# Patient Record
Sex: MALE | URBAN - NONMETROPOLITAN AREA
[De-identification: names, ages, dates, MRNs, and addresses within clinical notes are randomized per-mention and may not be internally consistent; named-entity substitution may affect disease eponyms.]

---

## 2019-06-06 ENCOUNTER — OFFICE VISIT (OUTPATIENT)
Dept: URBAN - NONMETROPOLITAN AREA CLINIC 9 | Facility: CLINIC | Age: 64
End: 2019-06-06

## 2019-06-06 DIAGNOSIS — H01.8 OTHER SPECIFIED INFLAMMATIONS OF EYELID: Chronic | ICD-10-CM

## 2019-06-06 PROCEDURE — 92002 INTRM OPH EXAM NEW PATIENT: CPT | Performed by: OPTOMETRIST

## 2019-06-06 RX ORDER — BACITRACIN 500 [USP'U]/G
OINTMENT OPHTHALMIC
Qty: 1 | Refills: 6 | Status: INACTIVE
Start: 2019-06-06 | End: 2020-07-15

## 2019-06-06 ASSESSMENT — INTRAOCULAR PRESSURE
OD: 20
OS: 20

## 2019-06-06 NOTE — IMPRESSION/PLAN
Impression: Age-related nuclear cataract, right eye: H25.11. Plan: Discussed diagnosis with patient in detail. No treatment is required at this time. Will continue to observe condition and/or symptoms. Patient instructed to call if condition gets worse.

## 2019-06-06 NOTE — IMPRESSION/PLAN
Impression: Dry eye syndrome of bilateral lacrimal glands: H04.123. Plan: Discussed diagnosis in detail with patient. Dry eye accounts for the patient's symptoms. Dry eye is a chronic condition and does not have a cure and will need artificial tears for maintenance. There is no evidence of permanent changes to the cornea. Recommend Systane Balance or Complete OU QID longterm. Monitor for changes.

## 2019-06-06 NOTE — IMPRESSION/PLAN
Impression: Other specified inflammations of eyelid: H01.8. OU. Plan: Blepharitis accounts for the patient's symptoms. Lid scrubs with diluted Shay Farrow and Conner tear free baby shampoo as directed and Bacitracin ointment qhs OU and monitor.

## 2020-07-15 ENCOUNTER — OFFICE VISIT (OUTPATIENT)
Dept: URBAN - NONMETROPOLITAN AREA CLINIC 9 | Facility: CLINIC | Age: 65
End: 2020-07-15
Payer: COMMERCIAL

## 2020-07-15 DIAGNOSIS — H59.812: Chronic | ICD-10-CM

## 2020-07-15 PROCEDURE — 92014 COMPRE OPH EXAM EST PT 1/>: CPT | Performed by: OPTOMETRIST

## 2020-07-15 RX ORDER — BACITRACIN 500 [USP'U]/G
OINTMENT OPHTHALMIC
Qty: 1 | Refills: 4 | Status: INACTIVE
Start: 2020-07-15 | End: 2021-05-20

## 2020-07-15 ASSESSMENT — INTRAOCULAR PRESSURE
OS: 20
OD: 18

## 2020-07-15 ASSESSMENT — VISUAL ACUITY
OD: 20/30
OS: 20/40

## 2020-07-15 ASSESSMENT — KERATOMETRY
OS: 42.13
OD: 41.38

## 2020-07-15 NOTE — IMPRESSION/PLAN
Impression: Age-related nuclear cataract, right eye: H25.11. Plan: Cataracts account for the patient's complaints. Discussed all risks, benefits, procedures and recovery of cataracts surgery. Patient understands that changing the glasses prescription will not significantly improve vision. Patient desires to have surgery, refer to Dr. Demetria Hampton for evaluation phacoemulsification with IOL.

## 2020-07-15 NOTE — IMPRESSION/PLAN
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 Plan: Stable vision OS, s/p RD repair. Discussed diagnosis with patient in detail. No treatment is required at this time. Will continue to observe condition and/or symptoms. Patient instructed to call if condition gets worse.

## 2020-07-15 NOTE — IMPRESSION/PLAN
Impression: Other specified inflammations of eyelid: H01.8. OU. Plan: Blepharitis accounts for the patient's symptoms. Lid scrubs with diluted Genene Chuy and Conner tear free baby shampoo as directed and Bacitracin ointment qhs OU and monitor.

## 2020-08-13 ENCOUNTER — OFFICE VISIT (OUTPATIENT)
Dept: URBAN - METROPOLITAN AREA CLINIC 62 | Facility: CLINIC | Age: 65
End: 2020-08-13
Payer: COMMERCIAL

## 2020-08-13 PROCEDURE — 92004 COMPRE OPH EXAM NEW PT 1/>: CPT | Performed by: OPHTHALMOLOGY

## 2020-08-13 PROCEDURE — 92133 CPTRZD OPH DX IMG PST SGM ON: CPT | Performed by: OPHTHALMOLOGY

## 2020-08-13 PROCEDURE — 76514 ECHO EXAM OF EYE THICKNESS: CPT | Performed by: OPHTHALMOLOGY

## 2020-08-13 RX ORDER — LATANOPROST 50 UG/ML
0.005 % SOLUTION OPHTHALMIC
Qty: 1 | Refills: 6 | Status: INACTIVE
Start: 2020-08-13 | End: 2021-03-26

## 2020-08-13 ASSESSMENT — INTRAOCULAR PRESSURE
OD: 16
OS: 19

## 2020-08-13 NOTE — IMPRESSION/PLAN
I spoke with Ira valle.  She just tapered off and stopped taking the sertraline altogether.  She is doing well without it.   Impression: Primary open-angle glaucoma, bilateral, indeterminate stage: H40.1134. Plan: Rec. trial latanoprost qHS OU.  3 to 4 weeks IOP check, and VF in [facility].

## 2020-08-13 NOTE — IMPRESSION/PLAN
Impression: Age-related nuclear cataract, right eye: H25.11. Plan: Consider surgery when symptoms worsen.

## 2020-08-27 ENCOUNTER — TESTING ONLY (OUTPATIENT)
Dept: URBAN - NONMETROPOLITAN AREA CLINIC 9 | Facility: CLINIC | Age: 65
End: 2020-08-27
Payer: COMMERCIAL

## 2020-08-27 PROCEDURE — 92083 EXTENDED VISUAL FIELD XM: CPT | Performed by: OPHTHALMOLOGY

## 2020-09-10 ENCOUNTER — OFFICE VISIT (OUTPATIENT)
Dept: URBAN - NONMETROPOLITAN AREA CLINIC 9 | Facility: CLINIC | Age: 65
End: 2020-09-10
Payer: COMMERCIAL

## 2020-09-10 DIAGNOSIS — Z96.1 PRESENCE OF PSEUDOPHAKIA: ICD-10-CM

## 2020-09-10 DIAGNOSIS — H25.11 AGE-RELATED NUCLEAR CATARACT, RIGHT EYE: ICD-10-CM

## 2020-09-10 PROCEDURE — 92083 EXTENDED VISUAL FIELD XM: CPT | Performed by: OPHTHALMOLOGY

## 2020-09-10 PROCEDURE — 92012 INTRM OPH EXAM EST PATIENT: CPT | Performed by: OPHTHALMOLOGY

## 2020-09-10 ASSESSMENT — INTRAOCULAR PRESSURE
OD: 14
OS: 16

## 2020-09-10 NOTE — IMPRESSION/PLAN
Impression: Primary open-angle glaucoma, bilateral, indeterminate stage: H40.1134. Plan: IOP improved on Latanoprost. Cont latanoprost qHS OU.

## 2021-03-26 ENCOUNTER — OFFICE VISIT (OUTPATIENT)
Dept: URBAN - NONMETROPOLITAN AREA CLINIC 9 | Facility: CLINIC | Age: 66
End: 2021-03-26
Payer: COMMERCIAL

## 2021-03-26 DIAGNOSIS — H25.811 COMBINED FORMS OF AGE-RELATED CATARACT, RIGHT EYE: Chronic | ICD-10-CM

## 2021-03-26 DIAGNOSIS — H40.1134 PRIMARY OPEN-ANGLE GLAUCOMA, BILATERAL, INDETERMINATE STAGE: Primary | Chronic | ICD-10-CM

## 2021-03-26 DIAGNOSIS — H04.123 DRY EYE SYNDROME OF BILATERAL LACRIMAL GLANDS: Chronic | ICD-10-CM

## 2021-03-26 PROCEDURE — 99214 OFFICE O/P EST MOD 30 MIN: CPT | Performed by: OPTOMETRIST

## 2021-03-26 RX ORDER — LATANOPROST 50 UG/ML
0.005 % SOLUTION OPHTHALMIC
Qty: 2.5 | Refills: 6 | Status: INACTIVE
Start: 2021-03-26 | End: 2021-06-17

## 2021-03-26 ASSESSMENT — INTRAOCULAR PRESSURE
OD: 18
OS: 20

## 2021-03-26 NOTE — IMPRESSION/PLAN
Impression: Primary open-angle glaucoma, bilateral, indeterminate stage: H40.1134. Plan: IOP elevated today. Cont latanoprost qHS OU. The patient's eye(s) are at risk of angle closure due to narrow anatomical angles. Discussed risks of angle closure to vision and recommended LPI eval/Tx with surgeon. Discussed symptoms of angle closure and instructed patient to notify clinic immediately if they occur prior to treatment. RTC with surgeon for LPI eval/Tx as indicated.

## 2021-05-20 ENCOUNTER — OFFICE VISIT (OUTPATIENT)
Dept: URBAN - NONMETROPOLITAN AREA CLINIC 9 | Facility: CLINIC | Age: 66
End: 2021-05-20
Payer: COMMERCIAL

## 2021-05-20 PROCEDURE — 99212 OFFICE O/P EST SF 10 MIN: CPT | Performed by: OPHTHALMOLOGY

## 2021-05-20 PROCEDURE — 92020 GONIOSCOPY: CPT | Performed by: OPHTHALMOLOGY

## 2021-05-20 RX ORDER — BIMATOPROST 0.3 MG/ML
0.03 % SOLUTION/ DROPS OPHTHALMIC
Qty: 3 | Refills: 6 | Status: INACTIVE
Start: 2021-05-20 | End: 2021-09-17

## 2021-05-20 RX ORDER — BIMATOPROST 0.1 MG/ML
0.01 % SOLUTION/ DROPS OPHTHALMIC
Qty: 3 | Refills: 6 | Status: INACTIVE
Start: 2021-05-20 | End: 2022-02-25

## 2021-05-20 ASSESSMENT — INTRAOCULAR PRESSURE
OD: 20
OD: 21
OS: 19
OS: 20

## 2021-05-20 NOTE — IMPRESSION/PLAN
Impression: Primary open-angle glaucoma, bilateral, severe stage: Z65.0897. Plan: Start Lumigan OU q HS if no better consider surgery.

## 2021-06-17 ENCOUNTER — OFFICE VISIT (OUTPATIENT)
Dept: URBAN - NONMETROPOLITAN AREA CLINIC 9 | Facility: CLINIC | Age: 66
End: 2021-06-17
Payer: COMMERCIAL

## 2021-06-17 DIAGNOSIS — H40.1133 PRIMARY OPEN-ANGLE GLAUCOMA, BILATERAL, SEVERE STAGE: Primary | ICD-10-CM

## 2021-06-17 PROCEDURE — 99212 OFFICE O/P EST SF 10 MIN: CPT | Performed by: OPHTHALMOLOGY

## 2021-06-17 ASSESSMENT — INTRAOCULAR PRESSURE
OS: 15
OS: 16
OD: 14
OD: 12

## 2021-06-17 NOTE — IMPRESSION/PLAN
Impression: Primary open-angle glaucoma, bilateral, severe stage: E63.0684. Plan: IOP improved on Lumigan OU QHS. Cont. Lumigan OU QHS. if no better consider surgery.

## 2021-09-17 ENCOUNTER — OFFICE VISIT (OUTPATIENT)
Dept: URBAN - NONMETROPOLITAN AREA CLINIC 8 | Facility: CLINIC | Age: 66
End: 2021-09-17
Payer: COMMERCIAL

## 2021-09-17 PROCEDURE — 99214 OFFICE O/P EST MOD 30 MIN: CPT | Performed by: OPTOMETRIST

## 2021-09-17 RX ORDER — DORZOLAMIDE HYDROCHLORIDE AND TIMOLOL MALEATE 20; 5 MG/ML; MG/ML
SOLUTION/ DROPS OPHTHALMIC
Qty: 5 | Refills: 6 | Status: INACTIVE
Start: 2021-09-17 | End: 2022-02-25

## 2021-09-17 ASSESSMENT — INTRAOCULAR PRESSURE
OS: 16
OD: 15

## 2021-09-17 NOTE — IMPRESSION/PLAN
Impression: Primary open-angle glaucoma, bilateral, severe stage: D92.1567. Plan: IOP Stable OU. Stop Lumigan OU QHS due to [place]. Start Dorz/Timolol BID OU. Rx given today.

## 2021-10-29 ENCOUNTER — OFFICE VISIT (OUTPATIENT)
Dept: URBAN - NONMETROPOLITAN AREA CLINIC 8 | Facility: CLINIC | Age: 66
End: 2021-10-29
Payer: COMMERCIAL

## 2021-10-29 PROCEDURE — 99213 OFFICE O/P EST LOW 20 MIN: CPT | Performed by: OPTOMETRIST

## 2021-10-29 ASSESSMENT — INTRAOCULAR PRESSURE
OD: 11
OS: 12

## 2021-10-29 NOTE — IMPRESSION/PLAN
Impression: Primary open-angle glaucoma, bilateral, severe stage: O59.1442. Plan: IOP Stable OU. Continue Dorz/Timolol BID OU.

## 2022-02-25 ENCOUNTER — OFFICE VISIT (OUTPATIENT)
Dept: URBAN - NONMETROPOLITAN AREA CLINIC 8 | Facility: CLINIC | Age: 67
End: 2022-02-25
Payer: COMMERCIAL

## 2022-02-25 PROCEDURE — 99214 OFFICE O/P EST MOD 30 MIN: CPT | Performed by: OPTOMETRIST

## 2022-02-25 RX ORDER — DORZOLAMIDE HYDROCHLORIDE AND TIMOLOL MALEATE 20; 5 MG/ML; MG/ML
SOLUTION/ DROPS OPHTHALMIC
Qty: 5 | Refills: 6 | Status: ACTIVE
Start: 2022-02-25

## 2022-02-25 ASSESSMENT — KERATOMETRY
OS: 41.75
OD: 41.25

## 2022-02-25 ASSESSMENT — INTRAOCULAR PRESSURE
OD: 14
OS: 14

## 2022-02-25 NOTE — IMPRESSION/PLAN
Impression: Primary open-angle glaucoma, bilateral, severe stage: B14.0952. Plan: IOP Stable OU. Will continue same regimen, tolerating medication Dorz/Timolol BID OU. RTC for VF 24-2. eRx sent today.

## 2022-02-25 NOTE — IMPRESSION/PLAN
Impression: Dry eye syndrome of bilateral lacrimal glands: H04.123. Plan: Continue artificial tears in OU for comfort, 3-4x's a day.

## 2022-06-22 ENCOUNTER — TESTING ONLY (OUTPATIENT)
Dept: URBAN - NONMETROPOLITAN AREA CLINIC 8 | Facility: CLINIC | Age: 67
End: 2022-06-22
Payer: COMMERCIAL

## 2022-06-22 DIAGNOSIS — H40.1133 PRIMARY OPEN-ANGLE GLAUCOMA, BILATERAL, SEVERE STAGE: Primary | ICD-10-CM

## 2022-07-08 ENCOUNTER — OFFICE VISIT (OUTPATIENT)
Dept: URBAN - NONMETROPOLITAN AREA CLINIC 8 | Facility: CLINIC | Age: 67
End: 2022-07-08
Payer: COMMERCIAL

## 2022-07-08 DIAGNOSIS — H04.123 DRY EYE SYNDROME OF BILATERAL LACRIMAL GLANDS: ICD-10-CM

## 2022-07-08 DIAGNOSIS — H35.443 AGE-RELATED RETICULAR DEGENERATION OF RETINA, BILATERAL: ICD-10-CM

## 2022-07-08 DIAGNOSIS — H40.1133 PRIMARY OPEN-ANGLE GLAUCOMA, BILATERAL, SEVERE STAGE: Primary | ICD-10-CM

## 2022-07-08 DIAGNOSIS — Z96.1 PRESENCE OF INTRAOCULAR LENS: ICD-10-CM

## 2022-07-08 PROCEDURE — 99213 OFFICE O/P EST LOW 20 MIN: CPT | Performed by: OPTOMETRIST

## 2022-07-08 RX ORDER — DORZOLAMIDE HYDROCHLORIDE AND TIMOLOL MALEATE 20; 5 MG/ML; MG/ML
SOLUTION/ DROPS OPHTHALMIC
Qty: 5 | Refills: 6 | Status: ACTIVE
Start: 2022-07-08

## 2022-07-08 ASSESSMENT — INTRAOCULAR PRESSURE
OS: 13
OD: 13

## 2022-07-08 NOTE — IMPRESSION/PLAN
Impression: Age-related reticular degeneration of retina, bilateral: H35.443. Plan: Discussed diagnosis in detail with patient. No treatment is required at this time. Will continue to observe condition and or symptoms. Patient instructed to call if condition gets worse or VA worsens.

## 2022-07-08 NOTE — IMPRESSION/PLAN
Impression: Presence of intraocular lens: Z96.1. Left. Plan: Discussed diagnosis in detail with patient. No treatment is required at this time. Will continue to observe condition and or symptoms. Patient instructed to call if condition gets worse or VA worsens.

## 2022-07-08 NOTE — IMPRESSION/PLAN
Impression: Primary open-angle glaucoma, bilateral, severe stage: K06.2376. Plan: Intraocular pressure well controlled, tolerating medications. Will continue with same regimen.  cosopt BID OU

## 2022-11-11 ENCOUNTER — OFFICE VISIT (OUTPATIENT)
Dept: URBAN - NONMETROPOLITAN AREA CLINIC 8 | Facility: CLINIC | Age: 67
End: 2022-11-11
Payer: COMMERCIAL

## 2022-11-11 DIAGNOSIS — H10.45 OTHER CHRONIC ALLERGIC CONJUNCTIVITIS: ICD-10-CM

## 2022-11-11 DIAGNOSIS — H40.1133 PRIMARY OPEN-ANGLE GLAUCOMA, BILATERAL, SEVERE STAGE: Primary | ICD-10-CM

## 2022-11-11 DIAGNOSIS — H04.123 DRY EYE SYNDROME OF BILATERAL LACRIMAL GLANDS: ICD-10-CM

## 2022-11-11 PROCEDURE — 99214 OFFICE O/P EST MOD 30 MIN: CPT | Performed by: OPTOMETRIST

## 2022-11-11 RX ORDER — PREDNISOLONE ACETATE 10 MG/ML
1 % SUSPENSION/ DROPS OPHTHALMIC
Qty: 5 | Refills: 0 | Status: INACTIVE
Start: 2022-11-11 | End: 2022-11-20

## 2022-11-11 RX ORDER — OLOPATADINE HYDROCHLORIDE OPHTHALMIC 2 MG/ML
0.2 % SOLUTION OPHTHALMIC
Qty: 2.5 | Refills: 11 | Status: ACTIVE
Start: 2022-11-11

## 2022-11-11 ASSESSMENT — INTRAOCULAR PRESSURE
OS: 14
OD: 14

## 2022-11-11 NOTE — IMPRESSION/PLAN
Impression: Other chronic allergic conjunctivitis: H10.45. Plan: Patient educated that symptoms are caused by ocular allergies and that treatment will help alleviate symptoms but that it will not prevent allergies. Patient educated that allergy testing with an allergy specialist may be necessary to identify  allergens affecting patient and treat condition. Prescribed Prednisolone acetate 1% QID OU 10 days, then start Olopatadine QD OU for maintenance. May use Opcon A OTC for ocular itch.

## 2022-11-11 NOTE — IMPRESSION/PLAN
Impression: Primary open-angle glaucoma, bilateral, severe stage: P68.2056. Plan: IOP stable. Continue to use Dorzolamide/ Timolol ou BID.

## 2023-05-12 ENCOUNTER — OFFICE VISIT (OUTPATIENT)
Dept: URBAN - NONMETROPOLITAN AREA CLINIC 8 | Facility: CLINIC | Age: 68
End: 2023-05-12
Payer: MEDICARE

## 2023-05-12 DIAGNOSIS — H25.11 AGE-RELATED NUCLEAR CATARACT OF RIGHT EYE: ICD-10-CM

## 2023-05-12 DIAGNOSIS — Z96.1 PRESENCE OF PSEUDOPHAKIA: ICD-10-CM

## 2023-05-12 DIAGNOSIS — H40.1133 PRIMARY OPEN-ANGLE GLAUCOMA, SEVERE STAGE, BILATERAL: Primary | ICD-10-CM

## 2023-05-12 DIAGNOSIS — H04.123 DRY EYE SYNDROME OF BILATERAL LACRIMAL GLANDS: ICD-10-CM

## 2023-05-12 PROCEDURE — 92014 COMPRE OPH EXAM EST PT 1/>: CPT | Performed by: OPTOMETRIST

## 2023-05-12 RX ORDER — DORZOLAMIDE HYDROCHLORIDE AND TIMOLOL MALEATE 20; 5 MG/ML; MG/ML
SOLUTION/ DROPS OPHTHALMIC
Qty: 5 | Refills: 5 | Status: ACTIVE
Start: 2023-05-12

## 2023-05-12 ASSESSMENT — INTRAOCULAR PRESSURE
OS: 14
OD: 15

## 2023-05-12 ASSESSMENT — KERATOMETRY
OD: 41.00
OS: 41.88

## 2023-05-12 NOTE — IMPRESSION/PLAN
Impression: Primary open-angle glaucoma, bilateral, severe stage: B40.3107. Plan: Intraocular pressure well controlled, tolerating medications. Will continue with same regimen. Dorzolamide/ Timolol ou BID. RTC for VF 24-2 and RNFL OCT. eRx sent today.

## 2023-05-12 NOTE — IMPRESSION/PLAN
Impression: Dry eye syndrome of bilateral lacrimal glands: H04.123. Plan: Continue OTC artificial tears in OU for comfort, 3-4x's a day.

## 2023-08-30 ENCOUNTER — TESTING ONLY (OUTPATIENT)
Dept: URBAN - NONMETROPOLITAN AREA CLINIC 8 | Facility: CLINIC | Age: 68
End: 2023-08-30
Payer: MEDICARE

## 2023-08-30 DIAGNOSIS — H40.1133 PRIMARY OPEN-ANGLE GLAUCOMA, BILATERAL, SEVERE STAGE: Primary | ICD-10-CM

## 2023-08-30 PROCEDURE — 92083 EXTENDED VISUAL FIELD XM: CPT | Performed by: OPTOMETRIST

## 2023-08-30 PROCEDURE — 92133 CPTRZD OPH DX IMG PST SGM ON: CPT | Performed by: OPTOMETRIST

## 2023-09-15 ENCOUNTER — OFFICE VISIT (OUTPATIENT)
Dept: URBAN - NONMETROPOLITAN AREA CLINIC 8 | Facility: CLINIC | Age: 68
End: 2023-09-15
Payer: MEDICARE

## 2023-09-15 DIAGNOSIS — H04.123 DRY EYE SYNDROME OF BILATERAL LACRIMAL GLANDS: ICD-10-CM

## 2023-09-15 DIAGNOSIS — H40.1133 PRIMARY OPEN-ANGLE GLAUCOMA, BILATERAL, SEVERE STAGE: Primary | ICD-10-CM

## 2023-09-15 PROCEDURE — 99213 OFFICE O/P EST LOW 20 MIN: CPT | Performed by: OPTOMETRIST

## 2023-09-15 RX ORDER — DORZOLAMIDE HYDROCHLORIDE AND TIMOLOL MALEATE 20; 5 MG/ML; MG/ML
SOLUTION/ DROPS OPHTHALMIC
Qty: 10 | Refills: 4 | Status: ACTIVE
Start: 2023-09-15

## 2023-09-15 ASSESSMENT — INTRAOCULAR PRESSURE
OS: 16
OD: 15

## 2024-01-11 ENCOUNTER — OFFICE VISIT (OUTPATIENT)
Dept: URBAN - NONMETROPOLITAN AREA CLINIC 8 | Facility: CLINIC | Age: 69
End: 2024-01-11
Payer: MEDICARE

## 2024-01-11 DIAGNOSIS — H40.1133 PRIMARY OPEN-ANGLE GLAUCOMA, BILATERAL, SEVERE STAGE: Primary | ICD-10-CM

## 2024-01-11 DIAGNOSIS — H04.123 DRY EYE SYNDROME OF BILATERAL LACRIMAL GLANDS: ICD-10-CM

## 2024-01-11 PROCEDURE — 99213 OFFICE O/P EST LOW 20 MIN: CPT | Performed by: OPTOMETRIST

## 2024-01-11 RX ORDER — DORZOLAMIDE HYDROCHLORIDE AND TIMOLOL MALEATE 20; 5 MG/ML; MG/ML
SOLUTION/ DROPS OPHTHALMIC
Qty: 10 | Refills: 5 | Status: ACTIVE
Start: 2024-01-11

## 2024-01-11 ASSESSMENT — INTRAOCULAR PRESSURE
OS: 14
OD: 14

## 2024-05-10 ENCOUNTER — OFFICE VISIT (OUTPATIENT)
Dept: URBAN - NONMETROPOLITAN AREA CLINIC 8 | Facility: CLINIC | Age: 69
End: 2024-05-10
Payer: MEDICARE

## 2024-05-10 DIAGNOSIS — H04.123 DRY EYE SYNDROME OF BILATERAL LACRIMAL GLANDS: ICD-10-CM

## 2024-05-10 DIAGNOSIS — H40.1133 PRIMARY OPEN-ANGLE GLAUCOMA, BILATERAL, SEVERE STAGE: Primary | ICD-10-CM

## 2024-05-10 PROCEDURE — 99213 OFFICE O/P EST LOW 20 MIN: CPT | Performed by: OPTOMETRIST

## 2024-05-10 RX ORDER — DORZOLAMIDE HYDROCHLORIDE AND TIMOLOL MALEATE 20; 5 MG/ML; MG/ML
SOLUTION/ DROPS OPHTHALMIC
Qty: 5 | Refills: 4 | Status: ACTIVE
Start: 2024-05-10

## 2024-05-10 ASSESSMENT — INTRAOCULAR PRESSURE
OD: 15
OS: 15

## 2024-09-09 ENCOUNTER — TECH ONLY (OUTPATIENT)
Dept: URBAN - NONMETROPOLITAN AREA CLINIC 8 | Facility: CLINIC | Age: 69
End: 2024-09-09
Payer: MEDICARE

## 2024-09-09 DIAGNOSIS — H40.1133 PRIMARY OPEN-ANGLE GLAUCOMA, BILATERAL, SEVERE STAGE: Primary | ICD-10-CM

## 2024-09-09 PROCEDURE — 92134 CPTRZ OPH DX IMG PST SGM RTA: CPT | Performed by: OPTOMETRIST

## 2024-09-13 ENCOUNTER — OFFICE VISIT (OUTPATIENT)
Dept: URBAN - NONMETROPOLITAN AREA CLINIC 8 | Facility: CLINIC | Age: 69
End: 2024-09-13
Payer: MEDICARE

## 2024-09-13 DIAGNOSIS — H04.123 DRY EYE SYNDROME OF BILATERAL LACRIMAL GLANDS: ICD-10-CM

## 2024-09-13 DIAGNOSIS — H40.1111 PRIMARY OPEN-ANGLE GLAUCOMA, MILD STAGE, RIGHT EYE: Primary | ICD-10-CM

## 2024-09-13 DIAGNOSIS — H40.1123 PRIMARY OPEN-ANGLE GLAUCOMA, SEVERE STAGE, LEFT EYE: ICD-10-CM

## 2024-09-13 PROCEDURE — 92014 COMPRE OPH EXAM EST PT 1/>: CPT | Performed by: OPTOMETRIST

## 2024-09-13 RX ORDER — DORZOLAMIDE HYDROCHLORIDE AND TIMOLOL MALEATE 20; 5 MG/ML; MG/ML
SOLUTION/ DROPS OPHTHALMIC
Qty: 5 | Refills: 4 | Status: ACTIVE
Start: 2024-09-13

## 2024-09-13 ASSESSMENT — INTRAOCULAR PRESSURE
OS: 15
OD: 14

## 2024-09-13 ASSESSMENT — KERATOMETRY
OS: 42.00
OD: 41.25

## 2025-01-16 ENCOUNTER — OFFICE VISIT (OUTPATIENT)
Dept: URBAN - NONMETROPOLITAN AREA CLINIC 8 | Facility: CLINIC | Age: 70
End: 2025-01-16
Payer: MEDICARE

## 2025-01-16 DIAGNOSIS — H40.1123 PRIMARY OPEN-ANGLE GLAUCOMA, SEVERE STAGE, LEFT EYE: Primary | ICD-10-CM

## 2025-01-16 DIAGNOSIS — H04.123 DRY EYE SYNDROME OF BILATERAL LACRIMAL GLANDS: ICD-10-CM

## 2025-01-16 DIAGNOSIS — H40.1111 PRIMARY OPEN-ANGLE GLAUCOMA, MILD STAGE, RIGHT EYE: ICD-10-CM

## 2025-01-16 PROCEDURE — 99213 OFFICE O/P EST LOW 20 MIN: CPT | Performed by: OPTOMETRIST

## 2025-01-16 RX ORDER — DORZOLAMIDE HYDROCHLORIDE AND TIMOLOL MALEATE 20; 5 MG/ML; MG/ML
SOLUTION/ DROPS OPHTHALMIC
Qty: 5 | Refills: 4 | Status: ACTIVE
Start: 2025-01-16

## 2025-01-16 ASSESSMENT — INTRAOCULAR PRESSURE
OS: 14
OD: 13

## 2025-05-22 ENCOUNTER — OFFICE VISIT (OUTPATIENT)
Dept: URBAN - NONMETROPOLITAN AREA CLINIC 8 | Facility: CLINIC | Age: 70
End: 2025-05-22
Payer: MEDICARE

## 2025-05-22 DIAGNOSIS — H04.123 DRY EYE SYNDROME OF BILATERAL LACRIMAL GLANDS: ICD-10-CM

## 2025-05-22 DIAGNOSIS — H40.1123 PRIMARY OPEN-ANGLE GLAUCOMA, SEVERE STAGE, LEFT EYE: Primary | ICD-10-CM

## 2025-05-22 PROCEDURE — 99213 OFFICE O/P EST LOW 20 MIN: CPT | Performed by: OPTOMETRIST

## 2025-05-22 RX ORDER — DORZOLAMIDE HYDROCHLORIDE AND TIMOLOL MALEATE 20; 5 MG/ML; MG/ML
SOLUTION/ DROPS OPHTHALMIC
Qty: 5 | Refills: 4 | Status: ACTIVE
Start: 2025-05-22

## 2025-05-22 ASSESSMENT — INTRAOCULAR PRESSURE
OS: 15
OD: 15